# Patient Record
Sex: FEMALE | Race: WHITE | Employment: FULL TIME | URBAN - METROPOLITAN AREA
[De-identification: names, ages, dates, MRNs, and addresses within clinical notes are randomized per-mention and may not be internally consistent; named-entity substitution may affect disease eponyms.]

---

## 2022-06-07 ENCOUNTER — HOSPITAL ENCOUNTER (OUTPATIENT)
Dept: PHYSICAL THERAPY | Age: 62
Setting detail: THERAPIES SERIES
Discharge: HOME | End: 2022-06-07
Payer: COMMERCIAL

## 2022-06-07 PROCEDURE — 97161 PT EVAL LOW COMPLEX 20 MIN: CPT

## 2022-06-07 NOTE — PROGRESS NOTES
[facility], [facility], [address], [address]  Phone: [phone]  Fax [phone]    Dear Referring Practitioner: Dr. Emmy Ryan,     We had the pleasure of evaluating the following patient for physical therapy services at [facility]. A summary of our findings can be found in the initial assessment below. This includes our plan of care. If you have any questions or concerns regarding these findings, please do not hesitate to contact me at the office phone number. Thank you for the referral.         Physician Signature:_______________________________Date:__________________  By signing above (or electronic signature), therapists plan is approved by physician        LOWER EXTREMITY PHYSICAL THERAPY EVALUATION      Evaluation Date: 6/7/2022    Patient Name: Nasir Lopez   YOB: 1960    Medical Diagnosis:  Other intervertebral disc degeneration, lumbar region [M51.36]  Treatment Diagnosis: LBP   Onset Date:  1.5 years ago     Referral Date: 6/7/2022   Referring Provider: Jerod Doyle, *   Insurance Provider: Waqar Solis  Restrictions/Precautions:    none    SUBJECTIVE FINDINGS    History of Present Illness:  Pt reports LBP has limited her walking over the past 1-1.5 years to less than \"5 yards\". Pt has had 2 epidurals, 2 MRIs, CT and x-ray. Dr. Emmy Ryan gave her steroid injection last week. The pain is significantly improved from the steroid injection last week. Prior to injection, pain in the L buttocks to the back of the leg to the knee. Pt fx R tibia in January. That injury has healed. Mild stiffness noted in the R knee. Walking is the primary issue. No pain with sitting, standing. Pain at times with sleeping but not much. Difficulty squatting and bending over. Pt goals to return to walking for exercise, return to golfing.        Medical History: No CA hx, no bone density issues. Current Functional Limitations: Walking for exercise  PLOF: Full functional activity without limits to pain. Currently, 80% PLOF. Red Flags:  none    Pain       Patient describes pain to be decreased since injection  Patient reports 0/10 pain at present and  2/10 pain at its worst.  Worsened by walking    Improved by nothing prior to injection. Pt. reports pain with coughing, sneezing and laughing:   []Yes   [x]No   []NA   Pt. reports bowel and bladder changes (incontinence, retention):   []Yes   [x]No   []NA   Pt. reports saddle paresthesia? []Yes   [x]No   []NA   Pt. reports that the knee/hip/ankle gives out, locks, pops, grinds     []Yes [x]   No    Pt's sleep is affected? []   Yes [x]   No  []   N/A      OBJECTIVE FINDINGS    Imaging Results: (+) DDD lumbar spine       Palpation/Tenderness/Visual Inspection       Standing: Mild increase in L pes planus. Noticeable L pelvic rotation. Forward R shoulder and HH IR. Increased lumbar lordosis, B knee extension. Prone:  R sacral SB, tenderness L NARGIS      Gait/Steps/Balance    []   WFL [x]    Dysfunction Noted. Comment: B hip extension limited with amb, ERS lumbar spine, decreased stride length and push off. Increased R pes planus. Decreased B K' flexion with swing through. []  All balance WFL unless otherwise noted below:  Single limb stance: B with hip IR, LOB, trendelenberg on R, forward pelvis on R.  SLS squat B hip IR, knee valgus, LOB and trendelenberg, R worse than L. ERS lumbar spine. Squat:  B quad dominance and hip IR, ERS lumbar spine.       Lumbar Range of Motion/Strength Testing      [x] All WFL except as marked below  ROM (*denotes pain) AROM PROM COMMENTS   Flexion WNL     Extension WNL     Sidebending Left WNL     Sidebending Right WNL     Rotation Left  WNL  []   Seated  [x]   Standing       Rotation Right WNL   []   Seated  [x]   Standing         Pelvis/Sacral Assessment  Special Test Findings Comments   Standing Christianne Floss' Sign []Neg   []Pos R   []Pos L   []NT    Iliac Crest [x]Level  []High R   []High L       ASIS [x]Level  []High R   []High L     PSIS [x]Level  []High R   []High L     Forward Flexion []Neg   []Pos R   []Pos L   []NT    Sacral Sulci []Even []Prominent R   []Prominent L     Supine   Leg Length [x]Level  []Long R   []Long L  []Sx on R []Sx on L     ASIS [x]Level  []High R   []High L     PSIS [x]Level  []High R   []High L     Long Sit Test []Neg   []Pos R   []Pos L   []NT    Prone   PSIS [x]Level  []High R   []High L     Sacral base []Even [x]Prominent R   []Prominent L    Sacral NARGIS []Even [x]Prominent R   []Prominent L    Flick sign []Neg   [x]Pos R   [x]Pos L   []NT        Other Special Tests Lumbar Spine and Hip  Special Test Findings   Standing:    Lumbar reposition [x]Neg   []Pos   Seated:    Slump Test/Dural Tension [x]Neg   []Pos R   [x]Pos L   []NT       Supine:    90/90 test  [x]Neg   []Pos R   []Pos L   []NT   Gaenslen's test []Neg   []Pos R   []Pos L   []NT   Straight Leg Raise [x]Neg   []Pos R   []Pos L   []NT   Lumbar Distraction  []Relief noted   []No relief noted  []Rebound pain   []NT   Hip scour []Neg   []Pos R   []Pos L   []NT   Medardo's test []Neg   []Pos R   []Pos L   []NT   Tomer's test []Neg   []Pos R   []Pos L   []NT   Oscillation []Neg   []Pos R   []Pos L   []NT   Femoral nerve tension test []Neg   []Pos R   [x]Pos L   []NT     Sensation/Motor Function   [x] All dermatomes WNL except as marked below   [x] All  myotomes WNL except as marked below      Dermatome Left Right   Anterior groin, 2-3 inches below ASIS (L1-L2)     Middle third anterior thigh (L3)     Patella and med malleolus (L4)     Fibular head and dorsum of foot (L5)     Lateral side and plantar surface of foot (S1)     Medial aspect of posterior thigh (S2)     Perianal area (S3,4)            Range of Motion/Strength Testing     [x] All ROM and strength WNL except as marked below   * Denotes limitation by pain    Range Tested AROM PROM MMT/Resisted    Left Right Left Right Left Right   Hip Flexion 100 100       Hip Extension         Hip Abduction         Hip Adduction         Hip IR     4-/5 4-/5   Hip ER 20 15   4-/5 4-/5   Knee Flexion     5/5 5/5   Knee Extension     5/5 5/5   Ankle Dorsiflex     5/5 5/5   Ankle Plantarflex         Ankle Inversion         Ankle Eversion     5/5 5/5     Flexibility     [x] All flexibility WNL except as marked below  Muscle Left Right   Hamstrings (90/90) [x]  WNL  [] Tight  [] NT [x]  WNL  [] Tight  [] NT   Gastroc []  WNL  [x] Tight  [] NT []  WNL  [x] Tight  [] NT   TFL/ITB (Grzegorz) []  WNL  [] Tight  [] NT []  WNL  [] Tight  [] NT   Iliopsoas (Husam) []  WNL  [x] Tight  [] NT []  WNL  [x] Tight  [] NT   Piriformis []  WNL  [] Tight  [] NT []  WNL  [] Tight  [] NT     Joint Mobility  Lumbar: Good overall lumbar segmental mobility. Hypomobility B SI joints  Hip: Hypomobility B hip joints  Knee: Ankle/Foot:    Reflexes/Trunk Strength    [x] All WNL except as marked below     Reflex Left Right Strength Strength   Quadriceps (L3,4)   Transv Abdominis    Achilles (S1,2)       Ankle clonus       Babinski           ASSESSMENT  Pt is a 61 y/o presenting to physical therapy with c/o LBP that has limited walking. Movement impairments AGMR B hip joints and ERS lumbar spine. Weakness in B gluteals and core musculature. Hypomobility in B SI.     Statement of Medical Necessity: Physical Therapy is both indicated and medically necessary as outlined in the POC to increase the likelihood of meeting the functionally related goals stated below.      Eval Complexity:    Decision Making: Low Complexity    PLAN OF CARE    Frequency: 2x/wk for 6 weeks   Current Treatment Recommendations: Therapeutic exercise, therapeutic activity, manual therapy, gait training, neuromuscular re-education    G-CODE  FOTO  49/100    GOALS  Short term goal 1: Pt independent with HEP   Short term goal 2: Pt gluteal strength improve by 1/3 muscle grade  Short term goal 3: Pt return to walking for exercise without limits to pain  Short term goal 4: Pt return to PLOF  Short term goal 5: Pt demonstrate good overall squat and bending posture and movement pattern. Thank you for the referral of this patient.      Time In: 8:45  Time Out: 9:30  Timed Code Treatment Minutes: 0   minutes            Total Treatment Time:  45 minutes      Maxim Black PT license #[id number]

## 2022-06-07 NOTE — FLOWSHEET NOTE
[facility], [facility], [address], [address]  Phone: [phone]  Fax [phone]     Physical Therapy: Daily Note   Patient: Gaston Mobley (39 y.o. female)   Examination Date: 2022   :  1960 MRN: [id number]   Visit #: 1  Auth needed? []  Yes    [x]  No   Amount authorized: Insurance: Payor: Ky Red[place] / Plan: Ky [place] NAP CHOICE POS II / Product Type: *No Product type* /   Insurance ID: [id number] - (Commercial)  Secondary Insurance (if applicable):    Referring Physician: Jacob Blount, *       PCP: Deidre Saucedo MD Medical Diagnosis: Other intervertebral disc degeneration, lumbar region [M51.36]    Treatment Diagnosis:     Plan of Care signed? []  Yes [x]  No  Latex Allergy? []  Yes []  No   Pace Maker? []  Yes []  No    Preferred Language for Healthcare:   [x] English       [] other:     RESTRICTIONS/PRECAUTIONS: none    Functional Outcome Measure/Scale:    Date Assessed 2022   Measure Used FOTO   Disability Score (%) 49/100     SUBJECTIVE EXAMINATION   Pain level:   0/10    Patient Report/Comments: Pt reports LBP has limited her walking over the past 1-1.5 years to less than \"5 yards\". Pt has had 2 epidurals, 2 MRIs, CT and x-ray. Dr. Juárez Records gave her steroid injection last week. The pain is significantly improved from the steroid injection last week. Prior to injection, pain in the L buttocks to the back of the leg to the knee. Pt fx R tibia in January. That injury has healed. Mild stiffness noted in the R knee. Walking is the primary issue. No pain with sitting, standing. Pain at times with sleeping but not much. Difficulty squatting and bending over. Pt goals to return to walking for exercise, return to golfing. OBJECTIVE EXAMINATION   Observation:     Test measurements:   Palpation/Tenderness/Visual Inspection       Standing: Mild increase in L pes planus. (05996)     [] Manual (98012)   []      [] Ther.  Act ([id number])   []       Electronically signed by Meenakshi Patten PT  on 6/7/2022 at 9:38 AM